# Patient Record
Sex: MALE | Race: WHITE | NOT HISPANIC OR LATINO
[De-identification: names, ages, dates, MRNs, and addresses within clinical notes are randomized per-mention and may not be internally consistent; named-entity substitution may affect disease eponyms.]

---

## 2017-03-09 PROBLEM — Z00.00 ENCOUNTER FOR PREVENTIVE HEALTH EXAMINATION: Status: ACTIVE | Noted: 2017-03-09

## 2017-05-15 ENCOUNTER — APPOINTMENT (OUTPATIENT)
Dept: DERMATOLOGY | Facility: CLINIC | Age: 22
End: 2017-05-15

## 2022-11-30 ENCOUNTER — NON-APPOINTMENT (OUTPATIENT)
Age: 27
End: 2022-11-30

## 2022-11-30 ENCOUNTER — APPOINTMENT (OUTPATIENT)
Dept: ORTHOPEDIC SURGERY | Facility: CLINIC | Age: 27
End: 2022-11-30

## 2022-11-30 DIAGNOSIS — S46.812A STRAIN OF OTHER MUSCLES, FASCIA AND TENDONS AT SHOULDER AND UPPER ARM LEVEL, LEFT ARM, INITIAL ENCOUNTER: ICD-10-CM

## 2022-11-30 DIAGNOSIS — S46.811A STRAIN OF OTHER MUSCLES, FASCIA AND TENDONS AT SHOULDER AND UPPER ARM LEVEL, RIGHT ARM, INITIAL ENCOUNTER: ICD-10-CM

## 2022-11-30 DIAGNOSIS — M54.12 RADICULOPATHY, CERVICAL REGION: ICD-10-CM

## 2022-11-30 PROCEDURE — 72040 X-RAY EXAM NECK SPINE 2-3 VW: CPT

## 2022-11-30 PROCEDURE — 99204 OFFICE O/P NEW MOD 45 MIN: CPT

## 2022-11-30 NOTE — DISCUSSION/SUMMARY
[de-identified] : I had a lengthy discussion with the patient regarding their current condition. We discussed the treatment options including operative and nonoperative management. At this time I recommended conservative management.  I offered him a topical 2% diclofenac cream.  Its use and side effects were discussed.  He will discontinue his other anti-inflammatories.  I gave him a muscle relaxer to take at nighttime only.  Its use and side effects were discussed.  He is referred to physical therapy.  He can apply moist heat.  I am ordering an MRI of the cervical spine due to the chronicity of his symptoms and his failure to improve with 6 weeks of anti-inflammatory treatments.  I will call him with the results of the MRI.  All questions were answered. SPRING

## 2022-11-30 NOTE — HISTORY OF PRESENT ILLNESS
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  He was referred by his  Dl Rosario. He states he has been experiencing neck pain for several years. 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 He states his neck has never felt the same since football. He reports it occasionally acts up on him, and disrupts his sleep at night due to pain. He reports most pain with neck extension and rotation to the left. He has been using Voltaren with relief for the past 6 weeks. Patient denies numbness and tingling to the extremities but occasionally his arms feel "dead". He has not done any formal PT. Of note, he reports prior R shoulder arthroscopy labrum repair, and L shoulder arthroscopy pec repair.

## 2022-11-30 NOTE — PHYSICAL EXAM
[de-identified] : Physical Exam: \par General: Well appearing, no acute distress \par Neurologic: A&Ox3, No focal deficits \par Head: NCAT without abrasions, lacerations, or ecchymosis to head, face, or scalp \par Eyes: No scleral icterus, no gross abnormalities \par Respiratory: Equal chest wall expansion bilaterally, no accessory muscle use \par Lymphatic: No lymphadenopathy palpated \par Skin: Warm and dry \par Psychiatric: Normal mood and affect\par \par Cervical spine is examined and reveals no deformity. Patient has no midline tenderness to palpation. The patient has bilateral paraspinal tenderness. Cervical flexion to 30 degrees. Bilateral cervical rotation to 40 degrees. Bilateral side bending to 25 degrees. Extension to 10 degrees.  Negative Spurlings test. 5/5 motor strength to all major muscle groups of the bilateral upper extremity. 2+ deep tendon reflexes in the bilateral upper extremity which are symmetric. Sensation is intact throughout the bilateral upper extremity. Compartments are soft and nontender bilaterally. 2+ radial pulses. [de-identified] : 3 views of cervical spine were performed today and available for me to review. Results were discussed with the patient. They demonstrate no f/x, or dislocation.  There is evidence of straightening of the cervical lordosis.\par

## 2022-12-23 ENCOUNTER — RX RENEWAL (OUTPATIENT)
Age: 27
End: 2022-12-23

## 2022-12-23 RX ORDER — CYCLOBENZAPRINE HYDROCHLORIDE 10 MG/1
10 TABLET, FILM COATED ORAL
Qty: 20 | Refills: 0 | Status: ACTIVE | COMMUNITY
Start: 2022-11-30 | End: 1900-01-01

## 2023-06-19 ENCOUNTER — OFFICE (OUTPATIENT)
Dept: URBAN - METROPOLITAN AREA CLINIC 105 | Facility: CLINIC | Age: 28
Setting detail: OPHTHALMOLOGY
End: 2023-06-19
Payer: COMMERCIAL

## 2023-06-19 ENCOUNTER — RX ONLY (RX ONLY)
Age: 28
End: 2023-06-19

## 2023-06-19 DIAGNOSIS — H40.053: ICD-10-CM

## 2023-06-19 PROCEDURE — 92012 INTRM OPH EXAM EST PATIENT: CPT | Performed by: OPHTHALMOLOGY

## 2023-06-19 ASSESSMENT — REFRACTION_AUTOREFRACTION
OD_SPHERE: +0.25
OS_CYLINDER: -0.25
OS_AXIS: 095
OS_SPHERE: +0.25
OD_AXIS: 129
OD_CYLINDER: -0.25

## 2023-06-19 ASSESSMENT — KERATOMETRY
OS_K1POWER_DIOPTERS: 42.50
OD_K1POWER_DIOPTERS: 42.50
OS_AXISANGLE_DEGREES: 082
OD_AXISANGLE_DEGREES: 081
OD_K2POWER_DIOPTERS: 43.25
OS_K2POWER_DIOPTERS: 43.00

## 2023-06-19 ASSESSMENT — VISUAL ACUITY
OS_BCVA: 20/15+2
OD_BCVA: 20/20

## 2023-06-19 ASSESSMENT — CONFRONTATIONAL VISUAL FIELD TEST (CVF)
OS_FINDINGS: FULL
OD_FINDINGS: FULL

## 2023-06-19 ASSESSMENT — TONOMETRY
OD_IOP_MMHG: 20
OS_IOP_MMHG: 21

## 2023-06-19 ASSESSMENT — AXIALLENGTH_DERIVED
OD_AL: 23.7742
OS_AL: 23.8209

## 2023-06-19 ASSESSMENT — SPHEQUIV_DERIVED
OD_SPHEQUIV: 0.125
OS_SPHEQUIV: 0.125

## 2023-12-10 ENCOUNTER — APPOINTMENT (OUTPATIENT)
Dept: ORTHOPEDIC SURGERY | Facility: CLINIC | Age: 28
End: 2023-12-10
Payer: COMMERCIAL

## 2023-12-10 VITALS — HEIGHT: 69 IN | WEIGHT: 260 LBS | BODY MASS INDEX: 38.51 KG/M2

## 2023-12-10 DIAGNOSIS — Q74.2 OTHER CONGENITAL MALFORMATIONS OF LOWER LIMB(S), INCLUDING PELVIC GIRDLE: ICD-10-CM

## 2023-12-10 DIAGNOSIS — Z78.9 OTHER SPECIFIED HEALTH STATUS: ICD-10-CM

## 2023-12-10 DIAGNOSIS — M21.41 FLAT FOOT [PES PLANUS] (ACQUIRED), RIGHT FOOT: ICD-10-CM

## 2023-12-10 DIAGNOSIS — I10 ESSENTIAL (PRIMARY) HYPERTENSION: ICD-10-CM

## 2023-12-10 DIAGNOSIS — M21.42 FLAT FOOT [PES PLANUS] (ACQUIRED), RIGHT FOOT: ICD-10-CM

## 2023-12-10 PROCEDURE — 73610 X-RAY EXAM OF ANKLE: CPT | Mod: LT

## 2023-12-10 PROCEDURE — 73620 X-RAY EXAM OF FOOT: CPT | Mod: LT

## 2023-12-10 PROCEDURE — 99203 OFFICE O/P NEW LOW 30 MIN: CPT

## 2023-12-10 RX ORDER — LOSARTAN POTASSIUM 50 MG/1
50 TABLET, FILM COATED ORAL
Refills: 0 | Status: ACTIVE | COMMUNITY

## 2024-07-01 ENCOUNTER — OFFICE (OUTPATIENT)
Dept: URBAN - METROPOLITAN AREA CLINIC 105 | Facility: CLINIC | Age: 29
Setting detail: OPHTHALMOLOGY
End: 2024-07-01
Payer: COMMERCIAL

## 2024-07-01 DIAGNOSIS — H40.053: ICD-10-CM

## 2024-07-01 PROCEDURE — 92133 CPTRZD OPH DX IMG PST SGM ON: CPT | Performed by: OPHTHALMOLOGY

## 2024-07-01 PROCEDURE — 76514 ECHO EXAM OF EYE THICKNESS: CPT | Performed by: OPHTHALMOLOGY

## 2024-07-01 PROCEDURE — 92012 INTRM OPH EXAM EST PATIENT: CPT | Performed by: OPHTHALMOLOGY

## 2024-07-01 ASSESSMENT — CONFRONTATIONAL VISUAL FIELD TEST (CVF)
OS_FINDINGS: FULL
OD_FINDINGS: FULL

## 2025-03-06 ENCOUNTER — OFFICE (OUTPATIENT)
Dept: URBAN - METROPOLITAN AREA CLINIC 88 | Facility: CLINIC | Age: 30
Setting detail: OPHTHALMOLOGY
End: 2025-03-06
Payer: COMMERCIAL

## 2025-03-06 DIAGNOSIS — H43.823: ICD-10-CM

## 2025-03-06 PROCEDURE — 92250 FUNDUS PHOTOGRAPHY W/I&R: CPT | Performed by: OPHTHALMOLOGY

## 2025-03-06 PROCEDURE — 92014 COMPRE OPH EXAM EST PT 1/>: CPT | Performed by: OPHTHALMOLOGY

## 2025-03-06 ASSESSMENT — KERATOMETRY
OD_K2POWER_DIOPTERS: 43.50
OD_K1POWER_DIOPTERS: 42.50
OS_K2POWER_DIOPTERS: 43.00
OS_AXISANGLE_DEGREES: 080
OD_AXISANGLE_DEGREES: 081
OS_K1POWER_DIOPTERS: 42.50

## 2025-03-06 ASSESSMENT — PACHYMETRY
OD_CT_UM: 604
OS_CT_UM: 600
OD_CT_CORRECTION: -4
OS_CT_CORRECTION: -4

## 2025-03-06 ASSESSMENT — REFRACTION_AUTOREFRACTION
OS_SPHERE: +0.25
OD_AXIS: 134
OS_AXIS: 093
OD_SPHERE: +0.25
OS_CYLINDER: -0.50
OD_CYLINDER: -0.25

## 2025-03-06 ASSESSMENT — TONOMETRY
OS_IOP_MMHG: 19
OD_IOP_MMHG: 20

## 2025-03-06 ASSESSMENT — VISUAL ACUITY
OD_BCVA: 20/15-
OS_BCVA: 20/15-

## 2025-03-06 ASSESSMENT — CONFRONTATIONAL VISUAL FIELD TEST (CVF)
OD_FINDINGS: FULL
OS_FINDINGS: FULL